# Patient Record
Sex: MALE | Race: WHITE | Employment: OTHER | URBAN - METROPOLITAN AREA
[De-identification: names, ages, dates, MRNs, and addresses within clinical notes are randomized per-mention and may not be internally consistent; named-entity substitution may affect disease eponyms.]

---

## 2020-11-19 ENCOUNTER — APPOINTMENT (OUTPATIENT)
Dept: GENERAL RADIOLOGY | Facility: CLINIC | Age: 70
End: 2020-11-19
Attending: EMERGENCY MEDICINE
Payer: COMMERCIAL

## 2020-11-19 ENCOUNTER — HOSPITAL ENCOUNTER (EMERGENCY)
Facility: CLINIC | Age: 70
Discharge: HOME | End: 2020-11-19
Attending: EMERGENCY MEDICINE | Admitting: EMERGENCY MEDICINE
Payer: COMMERCIAL

## 2020-11-19 VITALS
HEART RATE: 91 BPM | TEMPERATURE: 99 F | WEIGHT: 197 LBS | OXYGEN SATURATION: 93 % | RESPIRATION RATE: 20 BRPM | SYSTOLIC BLOOD PRESSURE: 161 MMHG | DIASTOLIC BLOOD PRESSURE: 82 MMHG

## 2020-11-19 DIAGNOSIS — J18.9 COMMUNITY ACQUIRED PNEUMONIA OF LEFT LOWER LOBE OF LUNG: ICD-10-CM

## 2020-11-19 LAB
ANION GAP SERPL CALCULATED.3IONS-SCNC: 9 MMOL/L (ref 3–14)
BASOPHILS # BLD AUTO: 0 10E9/L (ref 0–0.2)
BASOPHILS NFR BLD AUTO: 0.2 %
BUN SERPL-MCNC: 21 MG/DL (ref 7–30)
CALCIUM SERPL-MCNC: 8.8 MG/DL (ref 8.5–10.1)
CHLORIDE SERPL-SCNC: 107 MMOL/L (ref 94–109)
CO2 SERPL-SCNC: 24 MMOL/L (ref 20–32)
CREAT SERPL-MCNC: 1.17 MG/DL (ref 0.66–1.25)
D DIMER PPP FEU-MCNC: 0.4 UG/ML FEU (ref 0–0.5)
DIFFERENTIAL METHOD BLD: ABNORMAL
EOSINOPHIL # BLD AUTO: 0 10E9/L (ref 0–0.7)
EOSINOPHIL NFR BLD AUTO: 0 %
ERYTHROCYTE [DISTWIDTH] IN BLOOD BY AUTOMATED COUNT: 12.9 % (ref 10–15)
GFR SERPL CREATININE-BSD FRML MDRD: 62 ML/MIN/{1.73_M2}
GLUCOSE SERPL-MCNC: 113 MG/DL (ref 70–99)
HCT VFR BLD AUTO: 40 % (ref 40–53)
HGB BLD-MCNC: 13.7 G/DL (ref 13.3–17.7)
IMM GRANULOCYTES # BLD: 0 10E9/L (ref 0–0.4)
IMM GRANULOCYTES NFR BLD: 0.4 %
LYMPHOCYTES # BLD AUTO: 0.7 10E9/L (ref 0.8–5.3)
LYMPHOCYTES NFR BLD AUTO: 13.9 %
MCH RBC QN AUTO: 31 PG (ref 26.5–33)
MCHC RBC AUTO-ENTMCNC: 34.3 G/DL (ref 31.5–36.5)
MCV RBC AUTO: 91 FL (ref 78–100)
MONOCYTES # BLD AUTO: 0.9 10E9/L (ref 0–1.3)
MONOCYTES NFR BLD AUTO: 17.7 %
NEUTROPHILS # BLD AUTO: 3.6 10E9/L (ref 1.6–8.3)
NEUTROPHILS NFR BLD AUTO: 67.8 %
NRBC # BLD AUTO: 0 10*3/UL
NRBC BLD AUTO-RTO: 0 /100
NT-PROBNP SERPL-MCNC: 461 PG/ML (ref 0–900)
PLATELET # BLD AUTO: 216 10E9/L (ref 150–450)
POTASSIUM SERPL-SCNC: 3.5 MMOL/L (ref 3.4–5.3)
RBC # BLD AUTO: 4.42 10E12/L (ref 4.4–5.9)
SODIUM SERPL-SCNC: 140 MMOL/L (ref 133–144)
TROPONIN I SERPL-MCNC: <0.015 UG/L (ref 0–0.04)
WBC # BLD AUTO: 5.3 10E9/L (ref 4–11)

## 2020-11-19 PROCEDURE — 99285 EMERGENCY DEPT VISIT HI MDM: CPT | Mod: 25 | Performed by: EMERGENCY MEDICINE

## 2020-11-19 PROCEDURE — 71045 X-RAY EXAM CHEST 1 VIEW: CPT

## 2020-11-19 PROCEDURE — 85025 COMPLETE CBC W/AUTO DIFF WBC: CPT | Performed by: EMERGENCY MEDICINE

## 2020-11-19 PROCEDURE — 93005 ELECTROCARDIOGRAM TRACING: CPT | Performed by: EMERGENCY MEDICINE

## 2020-11-19 PROCEDURE — 93010 ELECTROCARDIOGRAM REPORT: CPT | Performed by: EMERGENCY MEDICINE

## 2020-11-19 PROCEDURE — 80048 BASIC METABOLIC PNL TOTAL CA: CPT | Performed by: EMERGENCY MEDICINE

## 2020-11-19 PROCEDURE — 83880 ASSAY OF NATRIURETIC PEPTIDE: CPT | Performed by: EMERGENCY MEDICINE

## 2020-11-19 PROCEDURE — C9803 HOPD COVID-19 SPEC COLLECT: HCPCS | Performed by: EMERGENCY MEDICINE

## 2020-11-19 PROCEDURE — 85379 FIBRIN DEGRADATION QUANT: CPT | Performed by: EMERGENCY MEDICINE

## 2020-11-19 PROCEDURE — 250N000011 HC RX IP 250 OP 636: Performed by: EMERGENCY MEDICINE

## 2020-11-19 PROCEDURE — U0003 INFECTIOUS AGENT DETECTION BY NUCLEIC ACID (DNA OR RNA); SEVERE ACUTE RESPIRATORY SYNDROME CORONAVIRUS 2 (SARS-COV-2) (CORONAVIRUS DISEASE [COVID-19]), AMPLIFIED PROBE TECHNIQUE, MAKING USE OF HIGH THROUGHPUT TECHNOLOGIES AS DESCRIBED BY CMS-2020-01-R: HCPCS | Performed by: EMERGENCY MEDICINE

## 2020-11-19 PROCEDURE — 250N000013 HC RX MED GY IP 250 OP 250 PS 637: Performed by: EMERGENCY MEDICINE

## 2020-11-19 PROCEDURE — 84484 ASSAY OF TROPONIN QUANT: CPT | Performed by: EMERGENCY MEDICINE

## 2020-11-19 PROCEDURE — 99284 EMERGENCY DEPT VISIT MOD MDM: CPT | Mod: 25 | Performed by: EMERGENCY MEDICINE

## 2020-11-19 RX ORDER — AZITHROMYCIN 250 MG/1
500 TABLET, FILM COATED ORAL ONCE
Status: COMPLETED | OUTPATIENT
Start: 2020-11-19 | End: 2020-11-19

## 2020-11-19 RX ORDER — ONDANSETRON 4 MG/1
4 TABLET, ORALLY DISINTEGRATING ORAL ONCE
Status: COMPLETED | OUTPATIENT
Start: 2020-11-19 | End: 2020-11-19

## 2020-11-19 RX ORDER — ACETAMINOPHEN 500 MG
1000 TABLET ORAL ONCE
Status: COMPLETED | OUTPATIENT
Start: 2020-11-19 | End: 2020-11-19

## 2020-11-19 RX ORDER — AZITHROMYCIN 250 MG/1
250 TABLET, FILM COATED ORAL DAILY
Qty: 4 TABLET | Refills: 0 | Status: SHIPPED | OUTPATIENT
Start: 2020-11-19 | End: 2020-11-23

## 2020-11-19 RX ADMIN — AZITHROMYCIN MONOHYDRATE 500 MG: 250 TABLET ORAL at 21:31

## 2020-11-19 RX ADMIN — ONDANSETRON 4 MG: 4 TABLET, ORALLY DISINTEGRATING ORAL at 20:52

## 2020-11-19 RX ADMIN — AMOXICILLIN AND CLAVULANATE POTASSIUM 1 TABLET: 875; 125 TABLET, FILM COATED ORAL at 21:31

## 2020-11-19 RX ADMIN — ACETAMINOPHEN 1000 MG: 500 TABLET, FILM COATED ORAL at 21:18

## 2020-11-19 NOTE — ED AVS SNAPSHOT
[facility] Emergency Dept  [address] [address]  Phone: [phone]  Fax: [phone]                                    Ar Del Toro   MRN: [id number]    Department: [facility] Emergency Dept   Date of Visit: 11/19/2020           After Visit Summary Signature Page    I have received my discharge instructions, and my questions have been answered. I have discussed any challenges I see with this plan with the nurse or doctor.    ..........................................................................................................................................  Patient/Patient Representative Signature      ..........................................................................................................................................  Patient Representative Print Name and Relationship to Patient    ..................................................               ................................................  Date                                   Time    ..........................................................................................................................................  Reviewed by Signature/Title    ...................................................              ..............................................  Date                                               Time          22EPIC Rev 08/18

## 2020-11-20 LAB
SARS-COV-2 RNA SPEC QL NAA+PROBE: NOT DETECTED
SPECIMEN SOURCE: NORMAL

## 2020-11-20 NOTE — ED TRIAGE NOTES
S/s started Sunday, c/o chest tightness, loss of taste and smell, fatigue, nausea/vomiting. Unable to eat. Pt diabetic, unable to take PO very well.

## 2020-11-20 NOTE — ED PROVIDER NOTES
History     Chief Complaint   Patient presents with     Shortness of Breath     Chest Pain     HPI  Ar Del Toro is a 69 year old male with a history of diabetes on Metformin who who presents for shortness of breath, body aches, headache, and fatigue.  Symptoms ongoing for the past 5 days.  No significant cough.  Denies abdominal pain, diarrhea.  He has had nausea with infrequent vomiting.  He just has not felt hungry.  She has not take anything for symptoms.  Subjective fevers and chills.    Allergies:  Allergies   Allergen Reactions     Metformin Other (See Comments)     diarrhea       Problem List:    There are no active problems to display for this patient.       Past Medical History:    No past medical history on file.    Past Surgical History:    No past surgical history on file.    Family History:    No family history on file.    Social History:  Marital Status:  Single [1]  Social History     Tobacco Use     Smoking status: Not on file   Substance Use Topics     Alcohol use: Not on file     Drug use: Not on file        Medications:         amoxicillin-clavulanate (AUGMENTIN) 875-125 MG tablet       azithromycin (ZITHROMAX Z-LUIS MIGUEL) 250 MG tablet          Review of Systems  A 4 point review of systems was performed. All pertinent positives and negatives were listed in the HPI and rest of ROS were otherwise negative.    Physical Exam   BP: (!) 144/88  Pulse: 100  Temp: 99  F (37.2  C)  Resp: 20  Weight: 89.4 kg (197 lb)  SpO2: 96 %      Physical Exam  Vitals signs and nursing note reviewed.   Constitutional:       General: He is in acute distress.      Appearance: He is well-developed. He is not diaphoretic.   HENT:      Head: Normocephalic and atraumatic.      Right Ear: External ear normal.      Left Ear: External ear normal.      Nose: Nose normal.   Eyes:      General: No scleral icterus.     Conjunctiva/sclera: Conjunctivae normal.   Neck:      Musculoskeletal: Normal range of motion.    Cardiovascular:      Rate and Rhythm: Normal rate and regular rhythm.   Pulmonary:      Effort: Pulmonary effort is normal. No respiratory distress.      Breath sounds: No stridor.   Abdominal:      General: There is no distension.      Palpations: Abdomen is soft.   Musculoskeletal:      Right lower leg: No edema.      Left lower leg: No edema.   Skin:     General: Skin is warm and dry.   Neurological:      Mental Status: He is alert and oriented to person, place, and time.   Psychiatric:         Behavior: Behavior normal.         ED Course        Procedures               EKG Interpretation:      Interpreted by Tony Izaguirre MD  Time reviewed: 2000  Symptoms at time of EKG: Shortness of breath   Rhythm: normal sinus   Rate: normal  Axis: normal  Ectopy: none  Conduction: normal  ST Segments/ T Waves: No ST-T wave changes  Q Waves: none  Comparison to prior: No old EKG available    Clinical Impression: normal EKG          Critical Care time:  none               Results for orders placed or performed during the hospital encounter of 11/19/20 (from the past 24 hour(s))   Basic metabolic panel   Result Value Ref Range    Sodium 140 133 - 144 mmol/L    Potassium 3.5 3.4 - 5.3 mmol/L    Chloride 107 94 - 109 mmol/L    Carbon Dioxide 24 20 - 32 mmol/L    Anion Gap 9 3 - 14 mmol/L    Glucose 113 (H) 70 - 99 mg/dL    Urea Nitrogen 21 7 - 30 mg/dL    Creatinine 1.17 0.66 - 1.25 mg/dL    GFR Estimate 62 >60 mL/min/[1.73_m2]    GFR Estimate If Black 72 >60 mL/min/[1.73_m2]    Calcium 8.8 8.5 - 10.1 mg/dL   CBC with platelets differential   Result Value Ref Range    WBC 5.3 4.0 - 11.0 10e9/L    RBC Count 4.42 4.4 - 5.9 10e12/L    Hemoglobin 13.7 13.3 - 17.7 g/dL    Hematocrit 40.0 40.0 - 53.0 %    MCV 91 78 - 100 fl    MCH 31.0 26.5 - 33.0 pg    MCHC 34.3 31.5 - 36.5 g/dL    RDW 12.9 10.0 - 15.0 %    Platelet Count 216 150 - 450 10e9/L    Diff Method Automated Method     % Neutrophils 67.8 %    % Lymphocytes 13.9 %     % Monocytes 17.7 %    % Eosinophils 0.0 %    % Basophils 0.2 %    % Immature Granulocytes 0.4 %    Nucleated RBCs 0 0 /100    Absolute Neutrophil 3.6 1.6 - 8.3 10e9/L    Absolute Lymphocytes 0.7 (L) 0.8 - 5.3 10e9/L    Absolute Monocytes 0.9 0.0 - 1.3 10e9/L    Absolute Eosinophils 0.0 0.0 - 0.7 10e9/L    Absolute Basophils 0.0 0.0 - 0.2 10e9/L    Abs Immature Granulocytes 0.0 0 - 0.4 10e9/L    Absolute Nucleated RBC 0.0    Troponin I   Result Value Ref Range    Troponin I ES <0.015 0.000 - 0.045 ug/L   Nt probnp inpatient   Result Value Ref Range    N-Terminal Pro BNP Inpatient 461 0 - 900 pg/mL   D dimer quantitative   Result Value Ref Range    D Dimer 0.4 0.0 - 0.50 ug/ml FEU   XR Chest Port 1 View    Narrative    XR CHEST PORT 1 VW 11/19/2020 8:25 PM    HISTORY: shortness of breath    COMPARISON: 12/28/2016      Impression    IMPRESSION: Patchy airspace opacities in the left mid lung and lung  base suspicious for developing pneumonia. No pleural effusion or  pneumothorax. The cardiac and mediastinal silhouette are normal.  Elevated right hemidiaphragm.    KAYCEE HART MD       Medications   amoxicillin-clavulanate (AUGMENTIN) 875-125 MG per tablet 1 tablet (has no administration in time range)   azithromycin (ZITHROMAX) tablet 500 mg (has no administration in time range)   acetaminophen (TYLENOL) tablet 1,000 mg (1,000 mg Oral Given 11/19/20 2118)   ondansetron (ZOFRAN-ODT) ODT tab 4 mg (4 mg Oral Given 11/19/20 2052)       Assessments & Plan (with Medical Decision Making)   6-year-old male presents for shortness of breath body aches, fatigue, headaches.  Temperature is 37.2  C, heart rate 100, SPO2 is 96% on room air.  He is breathing comfortably on room air.  D-dimer is normal at 0.4, unlikely pulmonary embolism.  NT proBNP is normal, no signs of heart failure EKG sinus rhythm no signs of ischemia and his troponin is normal, no signs of ACS.  White blood cell count is 4.3 which is reassuring and hemoglobin is  13.7, no signs of anemia causing symptoms.  Electrolytes are within normal limits.  Chest x-ray obtained, images reviewed independently as well as radiology read reviewed, concerning for left lower lobe infiltrate consistent with pneumonia.  The patient is breathing comfortably on room air and is safe to discharge home.  COVID-19 swab is pending.  He is given a dose of amoxicillin-clavulanate and oral azithromycin here and is discharged with prescriptions for amoxicillin-clavulanate and azithromycin and instructions to return if he has worsening symptoms or other concerns, otherwise follow-up in clinic.  The patient is in agreement with this plan.    I have reviewed the nursing notes.    I have reviewed the findings, diagnosis, plan and need for follow up with the patient.       New Prescriptions    AMOXICILLIN-CLAVULANATE (AUGMENTIN) 875-125 MG TABLET    Take 1 tablet by mouth 2 times daily for 7 days    AZITHROMYCIN (ZITHROMAX Z-LUIS MIGUEL) 250 MG TABLET    Take 1 tablet (250 mg) by mouth daily for 4 days       Final diagnoses:   Community acquired pneumonia of left lower lobe of lung       11/19/2020   [facility] EMERGENCY DEPT     Tony Izaguirre MD  11/19/20 1553

## 2021-07-20 ENCOUNTER — HOSPITAL ENCOUNTER (EMERGENCY)
Facility: CLINIC | Age: 71
Discharge: HOME | End: 2021-07-20
Attending: FAMILY MEDICINE | Admitting: FAMILY MEDICINE
Payer: COMMERCIAL

## 2021-07-20 VITALS
TEMPERATURE: 96.7 F | RESPIRATION RATE: 16 BRPM | WEIGHT: 195 LBS | DIASTOLIC BLOOD PRESSURE: 83 MMHG | SYSTOLIC BLOOD PRESSURE: 137 MMHG | OXYGEN SATURATION: 98 % | HEART RATE: 82 BPM

## 2021-07-20 DIAGNOSIS — L08.9 WOUND INFECTION: ICD-10-CM

## 2021-07-20 DIAGNOSIS — T14.8XXA WOUND INFECTION: ICD-10-CM

## 2021-07-20 PROCEDURE — 99283 EMERGENCY DEPT VISIT LOW MDM: CPT | Performed by: FAMILY MEDICINE

## 2021-07-20 PROCEDURE — 99282 EMERGENCY DEPT VISIT SF MDM: CPT | Performed by: FAMILY MEDICINE

## 2021-07-20 RX ORDER — CEPHALEXIN 500 MG/1
500 CAPSULE ORAL 4 TIMES DAILY
Qty: 40 CAPSULE | Refills: 0 | Status: SHIPPED | OUTPATIENT
Start: 2021-07-20 | End: 2021-07-30

## 2021-07-20 NOTE — ED PROVIDER NOTES
History     Chief Complaint   Patient presents with     Wound Check     possible bug bite posterior right calf after being in tall grass this weekend     HPI  Ar Del Toro is a 70 year old male, past medical history is significant for diabetes, dyslipidemia, hypertension, depression, presents to the emergency department with concerns of possible bug bite to the right calf [place] 4 kraft this past weekend.  The patient states that he was in tall grass and is not sure if he was stung or whether he was poked by some sharp stick or something in the back of the right calf.  It has been draining since that time oozing faintly bloody serous type fluid by description.  Today it seem more swollen and there was some redness around the area.  He notes no fever chills or sweats.    Allergies:  Allergies   Allergen Reactions     Metformin Other (See Comments)     diarrhea       Problem List:    There are no problems to display for this patient.       Past Medical History:    No past medical history on file.    Past Surgical History:    No past surgical history on file.    Family History:    No family history on file.    Social History:  Marital Status:  Single [1]  Social History     Tobacco Use     Smoking status: Not on file   Substance Use Topics     Alcohol use: Not on file     Drug use: Not on file        Medications:    cephALEXin (KEFLEX) 500 MG capsule          Review of Systems   All other systems reviewed and are negative.      Physical Exam   BP: 132/74  Pulse: 84  Temp: (!) 96.7  F (35.9  C)  Resp: 16  Weight: 88.5 kg (195 lb)  SpO2: 98 %      Physical Exam  Vitals and nursing note reviewed.   Constitutional:       Appearance: Normal appearance. He is normal weight.   Musculoskeletal:      Comments: On the back of the right calf area there is a puncture with some surrounding faint erythema about 2 cm in diameter which is tender.  There is clear  slightly bloody serous fluid leaking from the puncture.  There is no palpable foreign body or visualized foreign body.   Neurological:      Mental Status: He is alert.         ED Course        Procedures              Critical Care time:  none               No results found for this or any previous visit (from the past 24 hour(s)).    Medications - No data to display    Assessments & Plan (with Medical Decision Making)   70-year-old male who presents for evaluation of bug bite or puncture wound to the posterior right calf as described in the HPI and documented on exam.  This appears to be infected regardless of what the inciting event was.  I do not palpate any foreign body.  I recommended warm soapy soaks 20 to 30 minutes 3-4 times a day next 48 hours, Keflex 500 mg p.o. 4 times daily x10 days.  Because the patient is diabetic and does have some peripheral neuropathy he will need to be very careful with this and I have asked him to return if it does not appear to be improving.      Disclaimer: This note consists of symbols derived from keyboarding, dictation and/or voice recognition software. As a result, there may be errors in the script that have gone undetected. Please consider this when interpreting information found in this chart.      I have reviewed the nursing notes.    I have reviewed the findings, diagnosis, plan and need for follow up with the patient.          New Prescriptions    CEPHALEXIN (KEFLEX) 500 MG CAPSULE    Take 1 capsule (500 mg) by mouth 4 times daily for 10 days       Final diagnoses:   Wound infection       7/20/2021   [facility] EMERGENCY DEPT     Carlos Hicks MD  07/20/21 0911

## 2021-07-20 NOTE — DISCHARGE INSTRUCTIONS
Keflex 500 mg p.o. 4 times daily x10 days.  Warm soapy soaks 20 minutes at a time 3-4 times a day today and tomorrow.  Keep a very close eye on this area and if it appears to be getting worse in the next day or 2 please come back for recheck.

## 2023-04-30 ENCOUNTER — APPOINTMENT (OUTPATIENT)
Dept: CT IMAGING | Facility: HOSPITAL | Age: 73
End: 2023-04-30
Attending: EMERGENCY MEDICINE
Payer: COMMERCIAL

## 2023-04-30 LAB
ALBUMIN SERPL BCG-MCNC: 4.1 G/DL (ref 3.5–5.2)
ALP SERPL-CCNC: 96 U/L (ref 40–129)
ALT SERPL W P-5'-P-CCNC: 32 U/L (ref 10–50)
ANION GAP SERPL CALCULATED.3IONS-SCNC: 19 MMOL/L (ref 7–15)
AST SERPL W P-5'-P-CCNC: 23 U/L (ref 10–50)
BASOPHILS # BLD AUTO: 0 10E3/UL (ref 0–0.2)
BASOPHILS NFR BLD AUTO: 0 %
BILIRUB DIRECT SERPL-MCNC: <0.2 MG/DL (ref 0–0.3)
BILIRUB SERPL-MCNC: 0.5 MG/DL
BUN SERPL-MCNC: 25.6 MG/DL (ref 8–23)
CALCIUM SERPL-MCNC: 9.7 MG/DL (ref 8.8–10.2)
CHLORIDE SERPL-SCNC: 100 MMOL/L (ref 98–107)
CREAT SERPL-MCNC: 1.49 MG/DL (ref 0.67–1.17)
DEPRECATED HCO3 PLAS-SCNC: 23 MMOL/L (ref 22–29)
EOSINOPHIL # BLD AUTO: 0 10E3/UL (ref 0–0.7)
EOSINOPHIL NFR BLD AUTO: 0 %
ERYTHROCYTE [DISTWIDTH] IN BLOOD BY AUTOMATED COUNT: 13.4 % (ref 10–15)
GFR SERPL CREATININE-BSD FRML MDRD: 49 ML/MIN/1.73M2
GLUCOSE BLDC GLUCOMTR-MCNC: 373 MG/DL (ref 70–99)
GLUCOSE SERPL-MCNC: 403 MG/DL (ref 70–99)
HCT VFR BLD AUTO: 50 % (ref 40–53)
HGB BLD-MCNC: 16.9 G/DL (ref 13.3–17.7)
IMM GRANULOCYTES # BLD: 0.1 10E3/UL
IMM GRANULOCYTES NFR BLD: 0 %
LYMPHOCYTES # BLD AUTO: 0.5 10E3/UL (ref 0.8–5.3)
LYMPHOCYTES NFR BLD AUTO: 4 %
MCH RBC QN AUTO: 32 PG (ref 26.5–33)
MCHC RBC AUTO-ENTMCNC: 33.8 G/DL (ref 31.5–36.5)
MCV RBC AUTO: 95 FL (ref 78–100)
MONOCYTES # BLD AUTO: 0.7 10E3/UL (ref 0–1.3)
MONOCYTES NFR BLD AUTO: 5 %
NEUTROPHILS # BLD AUTO: 12.1 10E3/UL (ref 1.6–8.3)
NEUTROPHILS NFR BLD AUTO: 91 %
NRBC # BLD AUTO: 0 10E3/UL
NRBC BLD AUTO-RTO: 0 /100
PLATELET # BLD AUTO: 241 10E3/UL (ref 150–450)
POTASSIUM SERPL-SCNC: 4.1 MMOL/L (ref 3.4–5.3)
PROT SERPL-MCNC: 7.8 G/DL (ref 6.4–8.3)
RBC # BLD AUTO: 5.28 10E6/UL (ref 4.4–5.9)
SODIUM SERPL-SCNC: 142 MMOL/L (ref 136–145)
WBC # BLD AUTO: 13.4 10E3/UL (ref 4–11)

## 2023-04-30 PROCEDURE — 85025 COMPLETE CBC W/AUTO DIFF WBC: CPT | Performed by: STUDENT IN AN ORGANIZED HEALTH CARE EDUCATION/TRAINING PROGRAM

## 2023-04-30 PROCEDURE — 36415 COLL VENOUS BLD VENIPUNCTURE: CPT | Performed by: STUDENT IN AN ORGANIZED HEALTH CARE EDUCATION/TRAINING PROGRAM

## 2023-04-30 PROCEDURE — 85025 COMPLETE CBC W/AUTO DIFF WBC: CPT | Performed by: EMERGENCY MEDICINE

## 2023-04-30 PROCEDURE — 99285 EMERGENCY DEPT VISIT HI MDM: CPT | Mod: 25

## 2023-04-30 PROCEDURE — 80048 BASIC METABOLIC PNL TOTAL CA: CPT | Performed by: EMERGENCY MEDICINE

## 2023-04-30 PROCEDURE — 82962 GLUCOSE BLOOD TEST: CPT

## 2023-04-30 PROCEDURE — 258N000003 HC RX IP 258 OP 636: Performed by: STUDENT IN AN ORGANIZED HEALTH CARE EDUCATION/TRAINING PROGRAM

## 2023-04-30 PROCEDURE — 82435 ASSAY OF BLOOD CHLORIDE: CPT | Performed by: STUDENT IN AN ORGANIZED HEALTH CARE EDUCATION/TRAINING PROGRAM

## 2023-04-30 PROCEDURE — 82248 BILIRUBIN DIRECT: CPT | Performed by: EMERGENCY MEDICINE

## 2023-04-30 PROCEDURE — 82010 KETONE BODYS QUAN: CPT | Performed by: EMERGENCY MEDICINE

## 2023-04-30 PROCEDURE — 74177 CT ABD & PELVIS W/CONTRAST: CPT | Mod: MG

## 2023-04-30 PROCEDURE — 83690 ASSAY OF LIPASE: CPT | Performed by: EMERGENCY MEDICINE

## 2023-04-30 PROCEDURE — G1010 CDSM STANSON: HCPCS

## 2023-04-30 PROCEDURE — 250N000011 HC RX IP 250 OP 636: Performed by: EMERGENCY MEDICINE

## 2023-04-30 PROCEDURE — 96361 HYDRATE IV INFUSION ADD-ON: CPT

## 2023-04-30 RX ORDER — SODIUM CHLORIDE 9 MG/ML
INJECTION, SOLUTION INTRAVENOUS CONTINUOUS
Status: DISCONTINUED | OUTPATIENT
Start: 2023-05-01 | End: 2023-05-01

## 2023-04-30 RX ORDER — ONDANSETRON 2 MG/ML
4 INJECTION INTRAMUSCULAR; INTRAVENOUS EVERY 30 MIN PRN
Status: DISCONTINUED | OUTPATIENT
Start: 2023-04-30 | End: 2023-05-01

## 2023-04-30 RX ORDER — IOPAMIDOL 755 MG/ML
80 INJECTION, SOLUTION INTRAVASCULAR ONCE
Status: COMPLETED | OUTPATIENT
Start: 2023-05-01 | End: 2023-04-30

## 2023-04-30 RX ADMIN — SODIUM CHLORIDE 500 ML: 9 INJECTION, SOLUTION INTRAVENOUS at 23:03

## 2023-04-30 RX ADMIN — IOPAMIDOL 75 ML: 755 INJECTION, SOLUTION INTRAVENOUS at 23:45

## 2023-05-01 ENCOUNTER — HOSPITAL ENCOUNTER (EMERGENCY)
Facility: HOSPITAL | Age: 73
Discharge: HOME | End: 2023-05-01
Attending: EMERGENCY MEDICINE | Admitting: EMERGENCY MEDICINE
Payer: COMMERCIAL

## 2023-05-01 VITALS
TEMPERATURE: 98.3 F | OXYGEN SATURATION: 93 % | HEART RATE: 96 BPM | WEIGHT: 187 LBS | SYSTOLIC BLOOD PRESSURE: 210 MMHG | DIASTOLIC BLOOD PRESSURE: 100 MMHG | RESPIRATION RATE: 20 BRPM

## 2023-05-01 DIAGNOSIS — K85.90 ACUTE PANCREATITIS WITHOUT INFECTION OR NECROSIS, UNSPECIFIED PANCREATITIS TYPE: ICD-10-CM

## 2023-05-01 LAB
B-OH-BUTYR SERPL-SCNC: 1.5 MMOL/L
BASE EXCESS BLDV CALC-SCNC: 1.9 MMOL/L
HCO3 BLDV-SCNC: 27 MMOL/L (ref 24–30)
LACTATE SERPL-SCNC: 3.4 MMOL/L (ref 0.7–2)
LIPASE SERPL-CCNC: 1806 U/L (ref 13–60)
OXYHGB MFR BLDV: 50.8 % (ref 70–75)
PCO2 BLDV: 47 MM HG (ref 35–50)
PH BLDV: 7.37 [PH] (ref 7.35–7.45)
PO2 BLDV: 31 MM HG (ref 25–47)
SAO2 % BLDV: 51.7 % (ref 70–75)

## 2023-05-01 PROCEDURE — 250N000011 HC RX IP 250 OP 636: Performed by: EMERGENCY MEDICINE

## 2023-05-01 PROCEDURE — 258N000003 HC RX IP 258 OP 636: Performed by: EMERGENCY MEDICINE

## 2023-05-01 PROCEDURE — 96374 THER/PROPH/DIAG INJ IV PUSH: CPT | Mod: 59

## 2023-05-01 PROCEDURE — 83605 ASSAY OF LACTIC ACID: CPT | Performed by: EMERGENCY MEDICINE

## 2023-05-01 PROCEDURE — 36415 COLL VENOUS BLD VENIPUNCTURE: CPT | Performed by: EMERGENCY MEDICINE

## 2023-05-01 PROCEDURE — 82805 BLOOD GASES W/O2 SATURATION: CPT | Performed by: EMERGENCY MEDICINE

## 2023-05-01 PROCEDURE — 96361 HYDRATE IV INFUSION ADD-ON: CPT

## 2023-05-01 RX ORDER — ONDANSETRON 4 MG/1
4 TABLET, ORALLY DISINTEGRATING ORAL EVERY 8 HOURS PRN
Qty: 10 TABLET | Refills: 0 | Status: SHIPPED | OUTPATIENT
Start: 2023-05-01 | End: 2023-05-04

## 2023-05-01 RX ORDER — OXYCODONE HYDROCHLORIDE 5 MG/1
5 TABLET ORAL EVERY 6 HOURS PRN
Qty: 12 TABLET | Refills: 0 | Status: SHIPPED | OUTPATIENT
Start: 2023-05-01 | End: 2023-05-04

## 2023-05-01 RX ORDER — MORPHINE SULFATE 4 MG/ML
6 INJECTION, SOLUTION INTRAMUSCULAR; INTRAVENOUS
Status: DISCONTINUED | OUTPATIENT
Start: 2023-05-01 | End: 2023-05-01

## 2023-05-01 RX ADMIN — SODIUM CHLORIDE 1000 ML: 9 INJECTION, SOLUTION INTRAVENOUS at 00:26

## 2023-05-01 RX ADMIN — SODIUM CHLORIDE 1000 ML: 9 INJECTION, SOLUTION INTRAVENOUS at 00:31

## 2023-05-01 RX ADMIN — ONDANSETRON 4 MG: 2 INJECTION INTRAMUSCULAR; INTRAVENOUS at 00:15

## 2023-05-01 ASSESSMENT — ACTIVITIES OF DAILY LIVING (ADL): ADLS_ACUITY_SCORE: 35

## 2023-05-01 NOTE — ED TRIAGE NOTES
Pt states he has had abdominal pain and vomiting since yesterday afternoon. Pt states he was afraid to take his insulin but hasn't checked his blood sugar since Friday. Pt's BG in triage is 373.      Triage Assessment     Row Name 04/30/23 5069       Triage Assessment (Adult)    Airway WDL WDL       Respiratory WDL    Respiratory WDL WDL       Skin Circulation/Temperature WDL    Skin Circulation/Temperature WDL WDL       Cardiac WDL    Cardiac WDL WDL       Peripheral/Neurovascular WDL    Peripheral Neurovascular WDL WDL       Cognitive/Neuro/Behavioral WDL    Cognitive/Neuro/Behavioral WDL WDL               No

## 2023-05-01 NOTE — ED NOTES
Pt drank 240 ml of water approx 40 minutes ago and has tolerated well- no emesis since arrival. MD aware and will discharge pt home.

## 2023-05-01 NOTE — ED NOTES
Pt resting comfortably in bed at this time- IVF infusing. He states that nausea is improved after Zofran IVP. No requests or concerns at this time. Awaiting results.

## 2023-05-01 NOTE — ED PROVIDER NOTES
EMERGENCY DEPARTMENT ENCOUNTER      NAME: Ar Del Toro  AGE: 72 year old male  YOB: 1950  MRN: [id number]  EVALUATION DATE & TIME: No admission date for patient encounter.    [facility]    ED PROVIDER: Liborio Copeland M.D.      Chief Complaint   Patient presents with     Abdominal Pain     Hyperglycemia         FINAL IMPRESSION:  1. Acute pancreatitis without infection or necrosis, unspecified pancreatitis type          ED COURSE & MEDICAL DECISION MAKING:    Pertinent Labs & Imaging studies reviewed. (See chart for details)  72 year old male presents to the Emergency Department for evaluation of abdominal pain, nausea and vomiting. Patient appears non toxic with stable vitals signs, patient afebrile, slight tachycardia at triage but regular rate on my exam, no hypoxia or increased work of breathing.  Lungs are clear, patient has generalized tenderness with palpation to abdominal exam, no focal tenderness, no rigidity or distention, no guarding.  Denies any change in bowel or bladder habits, no chest or pulmonary complaints.  Considered DKA, no focal tenderness to suggest appendicitis, diverticulitis, cholecystitis or pancreatitis, low suspicion for obstruction, nothing to suggest perforation, GI bleed or mesenteric ischemia, no flank pain or dysuria to suggest pyelonephritis.  We will obtain screening labs, urine studies and CT imaging.  Patient was given IV fluids and antiemetics.    Reassessment: Labs by my independent interpretation concerning for elevated lipase suggestive of acute pancreatitis.  Reassuring is venous pH is 7.37 which does not indicate any acidosis, did appreciate lactic acid 3.4 and anion gap of 19 with an elevated glucose and serum ketones of 1.50, however with no acidosis and other clear diagnosis with elevated lipase and imaging studies suggesting acute pancreatitis, nothing at this time to suggest diabetic ketoacidosis.  Patient has not been taking his  diabetic medications secondary to his nausea and vomiting which I suspect is contributing to his mild ketosis, hyperglycemia, feel these will resolve once the pancreatitis is treated.  Patient was given IV fluids here, pain and antinausea medication and following this patient felt markedly improved and repeat exams were benign.  He was able to easily tolerate p.o. fluids here.  Otherwise labs showed no acute concerning findings and again CT imaging reported acute pancreatitis, cholelithiasis but no elevated LFTs or alk phos, bilirubin, certainly nothing to suggest biliary infection or obstruction.  Did note report of bilateral adrenal gland nodules and discussed this with the patient need for close outpatient management and follow-up.  Ultimately with marked improvement, reassuring vitals, benign exam and tolerance of p.o. feel the patient is safe for discharge at this time.  Will discharge home with a prescription for oxycodone and Zofran and recommend strict clear liquid diet for the next 2 days and advancing diet as tolerated for treatment of his pancreatitis.  We will instruct the patient to continue his outpatient diabetic medications and instruct very close follow-up with primary care in the next 5 to 7 days for continued outpatient management evaluation.  Discussed all these findings and recommendations with the patient felt reassured and comfortable discharge.  Return precautions provided.    Medical Decision Making    History:    Supplemental history from: Documented in chart, if applicable    External Record(s) reviewed: Documented in chart, if applicable.    Work Up:    Chart documentation includes differential considered and any EKGs or imaging independently interpreted by provider, where specified.    In additional to work up documented, I considered the following work up: Documented in chart, if applicable.    External consultation:    Discussion of management with another provider: Documented in  chart, if applicable    Complicating factors:    Care impacted by chronic illness: Diabetes, Hyperlipidemia and Hypertension    Care affected by social determinants of health: N/A    Disposition considerations: Discharge. I prescribed additional prescription strength medication(s) as charted. See documentation for any additional details.    11:35 PM I met with the patient to gather history and perform my exam. ED course and treatment discussed. Patient seen in [facility] due to critical capacity and boarding crisis leaving no ED rooms available.   12:57 AM I rechecked the patient. Repeat exam is benign. Discussed findings on CT scan. Anticipate discharge.  1:23 AM The nurse notes the patient was able to tolerate a glass of water without difficulty   1:46 AM I rechecked the patient. Repeat exam is benign, patient states he feels improved, tolerating p.o. fluids.  Discussed findings and discharge close follow-up.    At the conclusion of the encounter I discussed the results of all of the tests and the disposition. The questions were answered and return precautions provided. The patient or family acknowledged understanding and was agreeable with the care plan.         MEDICATIONS GIVEN IN THE EMERGENCY:  Medications   0.9% sodium chloride BOLUS (0 mLs Intravenous Stopped 5/1/23 0005)   0.9% sodium chloride BOLUS (0 mLs Intravenous Stopped 5/1/23 0149)   iopamidol (ISOVUE-370) solution 80 mL (75 mLs Intravenous $Given 4/30/23 8908)   0.9% sodium chloride BOLUS (0 mLs Intravenous Stopped 5/1/23 0149)       NEW PRESCRIPTIONS STARTED AT TODAY'S ER VISIT  Discharge Medication List as of 5/1/2023  1:56 AM      START taking these medications    Details   ondansetron (ZOFRAN ODT) 4 MG ODT tab Take 1 tablet (4 mg) by mouth every 8 hours as needed for nausea, Disp-10 tablet, R-0, Local Print      oxyCODONE (ROXICODONE) 5 MG tablet Take 1 tablet (5 mg) by mouth every 6 hours as needed for pain, Disp-12 tablet, R-0, Local Print        "           =================================================================    HPI    Patient information was obtained from: Patient     Use of Intrepreter: N/A       Ar Del Toro is a 72 year old male who presents with abdominal pain, nausea, and vomiting     The patient reports they have had \"whole stomach\" abdominal pain since this morning when they woke up feeling nauseous and \"couldn't hold nothing down\". The patient notes they forgot to take their diabetes and hypertension medication last night and were unable to this morning due to their vomiting. The patient took pepto-bismol without improvement to their symptoms. The patient denies having symptoms like this before. The patient denies any abdominal surgeries. The patient denies fever, chest pain, dysuria, or bloody stools.     REVIEW OF SYSTEMS   Constitutional:  Denies fever, chills  Respiratory:  Denies productive cough or increased work of breathing  Cardiovascular:  Denies chest pain, palpitations  GI:  Denies change in bowel or bladder habits. Positive for abdominal pain, nausea, vomiting  Musculoskeletal:  Denies any new muscle/joint swelling  Skin:  Denies rash   Neurologic:  Denies focal weakness  All systems negative except as marked.     PAST MEDICAL HISTORY:  History reviewed. No pertinent past medical history.    PAST SURGICAL HISTORY:  History reviewed. No pertinent surgical history.      CURRENT MEDICATIONS:    Prior to Admission medications    Not on File        ALLERGIES:  Allergies   Allergen Reactions     Metformin Other (See Comments)     diarrhea       FAMILY HISTORY:  No family history on file.    SOCIAL HISTORY:   Social History     Socioeconomic History     Marital status: Single       VITALS:  Patient Vitals for the past 24 hrs:   BP Temp Temp src Pulse Resp SpO2 Weight   05/01/23 0055 -- -- -- 96 20 93 % --   05/01/23 0024 (!) 210/100 -- -- 94 -- 94 % --   04/30/23 2244 (!) 191/93 98.3  F (36.8  C) Oral 103 18 98 % 84.8 kg (187 " lb)        PHYSICAL EXAM    Constitutional:  Awake, alert, in no apparent distress  HENT:  Normocephalic, Atraumatic. Bilateral external ears normal. Oropharynx moist. Nose normal. Neck- Normal range of motion with no guarding, No midline cervical tenderness, Supple, No stridor.   Eyes:  PERRL, EOMI with no signs of entrapment, Conjunctiva normal, No discharge.   Respiratory:  Normal breath sounds, No respiratory distress, No wheezing.    Cardiovascular:  Normal heart rate, Normal rhythm, No appreciable rubs or gallops.   GI:  Soft, No distension, No palpable masses. Generalized mild tenderness without guarding   Musculoskeletal:  Intact distal pulses, No edema. Good range of motion in all major joints. No tenderness to palpation or major deformities noted.  Integument:  Warm, Dry, No erythema, No rash.   Neurologic:  Alert & oriented, Normal motor function, Normal sensory function, No focal deficits noted.   Psychiatric:  Affect normal, Judgment normal, Mood normal.     LAB:  All pertinent labs reviewed and interpreted.  Results for orders placed or performed during the hospital encounter of 05/01/23   CT Abdomen Pelvis w Contrast    Impression    IMPRESSION:   1.  Acute pancreatitis. No pseudocyst formation.  2.  Cholelithiasis.  3.  Fatty infiltration of the liver.  4.  Bilateral adrenal gland nodules.    REFERENCE:  Management of Incidental Adrenal Masses: A White Paper of the ACR Incidental Findings Committee. J Am Leni Radiol 2017;14:5006-7942.    ? 1 cm and < 4 cm:     No prior imaging and no history of cancer:  1-2 cm: probably benign. Consider 12-month CT adrenal follow-up.  >2cm, <4 cm: Adrenal CT.    No prior imaging and history of cancer: Adrenal CT    Prior Imaging:  Stable for 1 year: Benign. No follow-up.  New or enlarging:  --Adrenal CT or resection if no history of cancer.  --PET/CT or biopsy if positive history of cancer.               Basic metabolic panel   Result Value Ref Range    Sodium 142  136 - 145 mmol/L    Potassium 4.1 3.4 - 5.3 mmol/L    Chloride 100 98 - 107 mmol/L    Carbon Dioxide (CO2) 23 22 - 29 mmol/L    Anion Gap 19 (H) 7 - 15 mmol/L    Urea Nitrogen 25.6 (H) 8.0 - 23.0 mg/dL    Creatinine 1.49 (H) 0.67 - 1.17 mg/dL    Calcium 9.7 8.8 - 10.2 mg/dL    Glucose 403 (H) 70 - 99 mg/dL    GFR Estimate 49 (L) >60 mL/min/1.73m2   CBC with platelets and differential   Result Value Ref Range    WBC Count 13.4 (H) 4.0 - 11.0 10e3/uL    RBC Count 5.28 4.40 - 5.90 10e6/uL    Hemoglobin 16.9 13.3 - 17.7 g/dL    Hematocrit 50.0 40.0 - 53.0 %    MCV 95 78 - 100 fL    MCH 32.0 26.5 - 33.0 pg    MCHC 33.8 31.5 - 36.5 g/dL    RDW 13.4 10.0 - 15.0 %    Platelet Count 241 150 - 450 10e3/uL    % Neutrophils 91 %    % Lymphocytes 4 %    % Monocytes 5 %    % Eosinophils 0 %    % Basophils 0 %    % Immature Granulocytes 0 %    NRBCs per 100 WBC 0 <1 /100    Absolute Neutrophils 12.1 (H) 1.6 - 8.3 10e3/uL    Absolute Lymphocytes 0.5 (L) 0.8 - 5.3 10e3/uL    Absolute Monocytes 0.7 0.0 - 1.3 10e3/uL    Absolute Eosinophils 0.0 0.0 - 0.7 10e3/uL    Absolute Basophils 0.0 0.0 - 0.2 10e3/uL    Absolute Immature Granulocytes 0.1 <=0.4 10e3/uL    Absolute NRBCs 0.0 10e3/uL   Glucose by meter   Result Value Ref Range    GLUCOSE BY METER POCT 373 (H) 70 - 99 mg/dL   Blood gas venous   Result Value Ref Range    pH Venous 7.37 7.35 - 7.45    pCO2 Venous 47 35 - 50 mm Hg    pO2 Venous 31 25 - 47 mm Hg    Bicarbonate Venous 27 24 - 30 mmol/L    Base Excess/Deficit (+/-) 1.9   mmol/L    Oxyhemoglobin Venous 50.8 (L) 70.0 - 75.0 %    O2 Sat, Venous 51.7 (L) 70.0 - 75.0 %   Result Value Ref Range    Lipase 1,806 (H) 13 - 60 U/L   Lactic acid whole blood   Result Value Ref Range    Lactic Acid 3.4 (H) 0.7 - 2.0 mmol/L   Hepatic function panel   Result Value Ref Range    Protein Total 7.8 6.4 - 8.3 g/dL    Albumin 4.1 3.5 - 5.2 g/dL    Bilirubin Total 0.5 <=1.2 mg/dL    Alkaline Phosphatase 96 40 - 129 U/L    AST 23 10 - 50 U/L     ALT 32 10 - 50 U/L    Bilirubin Direct <0.20 0.00 - 0.30 mg/dL   Ketone Beta-Hydroxybutyrate Quantitative   Result Value Ref Range    Ketone (Beta-Hydroxybutyrate) Quantitative 1.50 (H) <=0.30 mmol/L       RADIOLOGY:  CT Abdomen Pelvis w Contrast   Final Result   IMPRESSION:    1.  Acute pancreatitis. No pseudocyst formation.   2.  Cholelithiasis.   3.  Fatty infiltration of the liver.   4.  Bilateral adrenal gland nodules.      REFERENCE:   Management of Incidental Adrenal Masses: A White Paper of the ACR Incidental Findings Committee. J Am Leni Radiol 2017;14:3005-5179.      ? 1 cm and < 4 cm:       No prior imaging and no history of cancer:   1-2 cm: probably benign. Consider 12-month CT adrenal follow-up.   >2cm, <4 cm: Adrenal CT.      No prior imaging and history of cancer: Adrenal CT      Prior Imaging:   Stable for 1 year: Benign. No follow-up.   New or enlarging:   --Adrenal CT or resection if no history of cancer.   --PET/CT or biopsy if positive history of cancer.                               EKG:      I have independently reviewed and interpreted the EKG(s) documented above.    PROCEDURES:        I, Ritika Tierney, am serving as a scribe to document services personally performed by Liborio Copeland MD, based on my observation and the provider's statements to me. I, Liborio Copeland MD attest that Ritika Tierney is acting in a scribe capacity, has observed my performance of the services and has documented them in accordance with my direction.    Liborio Copeland M.D.  Emergency Medicine  [facility] EMERGENCY DEPARTMENT  [address] [place] [id number]  [phone]  Dept: [phone]     Liborio Copeland MD  05/01/23 020

## 2023-05-18 ENCOUNTER — HOSPITAL ENCOUNTER (OUTPATIENT)
Dept: CT IMAGING | Facility: CLINIC | Age: 73
Discharge: HOME | End: 2023-05-18
Attending: INTERNAL MEDICINE

## 2023-05-18 DIAGNOSIS — Z00.6 RESEARCH EXAM: ICD-10-CM

## 2023-05-18 PROCEDURE — 71250 CT THORAX DX C-: CPT | Mod: TC
